# Patient Record
Sex: FEMALE | Employment: FULL TIME | URBAN - METROPOLITAN AREA
[De-identification: names, ages, dates, MRNs, and addresses within clinical notes are randomized per-mention and may not be internally consistent; named-entity substitution may affect disease eponyms.]

---

## 2020-03-19 ENCOUNTER — OFFICE VISIT (OUTPATIENT)
Dept: FAMILY MEDICINE CLINIC | Age: 30
End: 2020-03-19

## 2020-03-19 VITALS
BODY MASS INDEX: 50.02 KG/M2 | HEART RATE: 81 BPM | TEMPERATURE: 99.1 F | WEIGHT: 293 LBS | HEIGHT: 64 IN | DIASTOLIC BLOOD PRESSURE: 67 MMHG | RESPIRATION RATE: 16 BRPM | OXYGEN SATURATION: 97 % | SYSTOLIC BLOOD PRESSURE: 104 MMHG

## 2020-03-19 DIAGNOSIS — E55.9 VITAMIN D DEFICIENCY: Primary | ICD-10-CM

## 2020-03-19 DIAGNOSIS — E66.01 OBESITY, MORBID (HCC): ICD-10-CM

## 2020-03-19 DIAGNOSIS — Z76.89 ENCOUNTER FOR WEIGHT MANAGEMENT: ICD-10-CM

## 2020-03-19 DIAGNOSIS — Z13.1 SCREENING FOR DIABETES MELLITUS: ICD-10-CM

## 2020-03-19 DIAGNOSIS — D35.2 PITUITARY MICROADENOMA (HCC): ICD-10-CM

## 2020-03-19 DIAGNOSIS — Z13.220 SCREENING FOR HYPERLIPIDEMIA: ICD-10-CM

## 2020-03-19 RX ORDER — ERGOCALCIFEROL 1.25 MG/1
CAPSULE ORAL
COMMUNITY
Start: 2020-02-26

## 2020-03-19 RX ORDER — CABERGOLINE 0.5 MG/1
TABLET ORAL
COMMUNITY
Start: 2020-01-05

## 2020-03-19 NOTE — PROGRESS NOTES
1. Have you been to the ER, urgent care clinic since your last visit? Hospitalized since your last visit? No    2. Have you seen or consulted any other health care providers outside of the [address] [place] [address] since your last visit? Include any pap smears or colon screening.  No      Chief Complaint   Patient presents with    Weight Management     Initial     Body Weight: 350.6 LB  Body Fat%: 57.4 %  Muscle Mass Weight: 60.2  Body Water Weight: 26.5 %  Basal Metabolic Rate: 6808      Visit Vitals  /67 (BP 1 Location: Left arm, BP Patient Position: Sitting)   Pulse 81   Temp 99.1 °F (37.3 °C) (Oral)   Resp 16   Ht 5' 4\" (1.626 m)   Wt (!) 350 lb 9.6 oz (159 kg)   SpO2 97%   BMI 60.18 kg/m²

## 2020-03-19 NOTE — PROGRESS NOTES
[facility] Progress Note: Initial Physician Visit     Shivam Fuentes is a 26 y.o. female who is here for medical screening for entering the [facility].     CC:  Obesity  Has tried a program lifestyle wellness  She took phentermine  Regained it after stress increased the last 3-4 months  Se has mild cecilia and just got prescribed cpap  Weight History  I personally reviewed the [place] completed by patient and scanned into media section of chart. It includes duration of their obesity, maximum weight, goal weight and weight gain time line (timing), all of which give the context of their obesity AND a Family History of their obesity. Is their Weight Loss Goal entered in to Comments? Weight loss History  I personally reviewed the Medical Screening Newt Hylan completed by the patient and scanned into media section of chart. It includes number of weight loss attempts, the weight loss program that patients was most successful using, and if they have any hx of anorectic medication use, including OTC supplements. This captures modifying factors. Significant Medical History  History reviewed. No pertinent past medical history. I personally reviewed the Medical Screening Newt Hylan completed by the patient and scanned into media section of chart. This allows me to assess associated symptoms that are significant in the assessment of the patient's obesity and the patient's Past Medical History.     Outpatient Medications Marked as Taking for the 3/19/20 encounter (Office Visit) with Elma Whiting MD   Medication Sig Dispense Refill    cabergoline (DOSTINEX) 0.5 mg tablet TAKE 1 TABLET BY MOUTH TWICE A WEEK ON MONDAY AND THURSDAY      ergocalciferol (ERGOCALCIFEROL) 1,250 mcg (50,000 unit) capsule TAKE 1 CAPSULE BY MOUTH ONCE WEEKLY         SLEEP:      Significant Psychosocial History   Has a doctor every diagnosed with Binge Eating Disorder, Bulemia or Anorexia? : no     Compliance  Upcoming Travel? no    Social History  Social History     Tobacco Use    Smoking status: Never Smoker    Smokeless tobacco: Never Used   Substance Use Topics    Alcohol use: Yes     Comment: social       Exercise  I personally reviewed the Medical Screening Lucy Donovanist completed by the patient and scanned into media section of chart. Review of Systems  See HPI        Objective  Visit Vitals  /67 (BP 1 Location: Left arm, BP Patient Position: Sitting)   Pulse 81   Temp 99.1 °F (37.3 °C) (Oral)   Resp 16   Ht 5' 4\" (1.626 m)   Wt (!) 350 lb 9.6 oz (159 kg)   SpO2 97%   BMI 60.18 kg/m²         Weight Metrics 3/19/2020 3/19/2020 6/20/2016   Weight - 350 lb 9.6 oz 313 lb   Neck Circ (inches) 15.5 - -   Waist Measure Inches 61 - -   BMI - 60.18 kg/m2 -       Labs: See  labs scanned into Media section or in lab section of record      Physical Exam    Vital Signs Reviewed  Weight Management Metrics Reviewed    Appearance: well  HEENT:  Scleral icterus?  no  Neck:  Thyromegaly or nodules? no  Mouth: Large tongue no  Heart:  rrr  Lungs:  clear  Abdomen:     Hepatomegaly? no   Striae present? no  Skin:    Acne?  no   Hirsutism? no   Skin tags? no   Acanthosis Nigricans?  no  Ext:  Edema? no      Assessment & Plan  Encounter Diagnoses   Name Primary?  Vitamin D deficiency Yes    Obesity, [facility]bid [facility] [address]     Pituitary microadenoma ([facility] [address]     Encounter for weight management     Screening for diabetes mellitus     Screening for hyperlipidemia        1. labs reviewed w/ patient  2. EKG reviewed w/ patient:   Personally reviewed by me, NSR, No abnormalities,    QTc = 414 sec (Upper limit is 440 for males & 460 for females)      3.  Medication changes include:     Based on his history, labs and EKG, Kat Hall is  a good candidate for the [facility]     25 min of the 45 minutes face to face time with Belia consisted of counseling & coordinating and/or discussing treatment plans in reference to her obesity The primary encounter diagnosis was Vitamin D deficiency. Diagnoses of Obesity, morbid ([address] Pituitary microadenoma ([facility] [address] Encounter for weight management, Screening for diabetes mellitus, and Screening for hyperlipidemia were also pertinent to this visit.

## 2020-04-20 ENCOUNTER — VIRTUAL VISIT (OUTPATIENT)
Dept: FAMILY MEDICINE CLINIC | Age: 30
End: 2020-04-20

## 2020-04-20 VITALS — BODY MASS INDEX: 58.88 KG/M2 | WEIGHT: 293 LBS

## 2020-04-20 DIAGNOSIS — D35.2 PITUITARY MICROADENOMA (HCC): ICD-10-CM

## 2020-04-20 DIAGNOSIS — E66.01 OBESITY, MORBID (HCC): ICD-10-CM

## 2020-04-20 DIAGNOSIS — E55.9 VITAMIN D DEFICIENCY: ICD-10-CM

## 2020-04-20 DIAGNOSIS — G47.33 OSA (OBSTRUCTIVE SLEEP APNEA): Primary | ICD-10-CM

## 2020-04-20 NOTE — PROGRESS NOTES
[facility] Progress Note:   F/up Physician Visit    Bin Restrepo is a 26 y.o. female who was seen by synchronous (real-time) audio-video technology on 4/20/2020. Consent:  She and/or her healthcare decision maker is aware that this patient-initiated Telehealth encounter is a billable service, with coverage as determined by her insurance carrier. She is aware that she may receive a bill and has provided verbal consent to proceed: Yes    I was at home while conducting this encounter. 712  Subjective:   Bin Restrepo was seen for No chief complaint on file. f/up physician visit for the [facility] / LCD Program.  Prior to Admission medications    Medication Sig Start Date End Date Taking?  Authorizing Provider   cabergoline (DOSTINEX) 0.5 mg tablet TAKE 1 TABLET BY MOUTH TWICE A WEEK ON MONDAY AND THURSDAY 1/5/20   Provider, Historical   ergocalciferol (ERGOCALCIFEROL) 1,250 mcg (50,000 unit) capsule TAKE 1 CAPSULE BY MOUTH ONCE WEEKLY 2/26/20   Provider, Historical     Not on File    Patient Active Problem List    Diagnosis Date Noted    Obesity, morbid ([facility] [address] 03/19/2020     Current Outpatient Medications   Medication Sig Dispense Refill    cabergoline (DOSTINEX) 0.5 mg tablet TAKE 1 TABLET BY MOUTH TWICE A WEEK ON MONDAY AND THURSDAY      ergocalciferol (ERGOCALCIFEROL) 1,250 mcg (50,000 unit) capsule TAKE 1 CAPSULE BY MOUTH ONCE WEEKLY         ROS      CC: Weight Management      Bin Restrepo is a 26 y.o. female who is here for her      Weight Metrics 4/20/2020 3/19/2020 3/19/2020 6/20/2016   Weight 343 lb - 350 lb 9.6 oz 313 lb   Neck Circ (inches) - 15.5 - -   Waist Measure Inches - 61 - -   BMI 58.88 kg/m2 - 60.18 kg/m2 -               Participation   Did you attend clinic and class last week? no    Review of Systems  Since your last visit, have you experienced any complications? no  If yes, please list:     Are you taking an appetite suppressant? no  If so, is there any Chest Pain, Palpitations or Dizziness? BP Readings from Last 3 Encounters:   03/19/20 104/67       SLEEP: 6-7, has cpap but not sleeping the whole night with    Have you received any other medical care this week? no  If yes, where and for what? Have you discontinued or changed any medicine or dose of your medicine since your last visit with Dr Mick Etienne? no  If yes, where and for what? Diet  How many ounces of calorie-free liquids did you consume each day? 64* oz    How many meal replacements did you take each day? 2 and 1 meal    Did you have any problems adhering to the program?  no If yes, please explain:      Exercise  Aerobic exercise: 45 min  Resistance exercise: *5 workouts / week  Any discomfort? If yes, where? Objective  Visit Vitals  Wt 343 lb (155.6 kg)   BMI 58.88 kg/m²     No LMP recorded.                     PHYSICAL EXAMINATION:  [ INSTRUCTIONS:  \"[x]\" Indicates a positive item  \"[]\" Indicates a negative item  -- DELETE ALL ITEMS NOT EXAMINED]  Vital Signs: (As obtained by patient/caregiver at home)  Visit Vitals  Wt 343 lb (155.6 kg)   BMI 58.88 kg/m²        Constitutional: [x] Appears well-developed and well-nourished [x] No apparent distress      [] Abnormal -     Mental status: [x] Alert and awake  [x] Oriented to person/place/time [x] Able to follow commands    [] Abnormal -     Eyes:   EOM    [x]  Normal    [] Abnormal -   Sclera  [x]  Normal    [] Abnormal -          Discharge [x]  None visible   [] Abnormal -     HENT: [x] Normocephalic, atraumatic  [] Abnormal -   [x] Mouth/Throat: Mucous membranes are moist    External Ears [x] Normal  [] Abnormal -    Neck: [x] No visualized mass [] Abnormal -     Pulmonary/Chest: [x] Respiratory effort normal   [x] No visualized signs of difficulty breathing or respiratory distress        [] Abnormal -      Musculoskeletal:   [x] Normal gait with no signs of ataxia         [x] Normal range of motion of neck        [] Abnormal - Neurological:        [x] No Facial Asymmetry (Cranial nerve 7 motor function) (limited exam due to video visit)          [x] No gaze palsy        [] Abnormal -          Skin:        [x] No significant exanthematous lesions or discoloration noted on facial skin         [] Abnormal -            Psychiatric:       [x] Normal Affect [] Abnormal -        [x] No Hallucinations    Other pertinent observable physical exam findings:-      Assessment / Plan    Encounter Diagnoses   Name Primary?  ZAHRAA (obstructive sleep apnea) Yes    Obesity, morbid (HCC)     Pituitary microadenoma (HCC)     Vitamin D deficiency      Diagnoses and all orders for this visit:    1. ZAHRAA (obstructive sleep apnea)  Struggling getting comfortable using the cpap  I encouraged her to keep trying to use it. Wear it as long as she can tolerate and each night it will get easier  2. Obesity, morbid ([address]  Has lost 7 lbs in 1 month  I will ask our dietitian to call her to give more support  She just started as we shut down for covid 19  3. Pituitary microadenoma ([address]  Managed by endocrine  4. Vitamin D deficiency      1. Weight management improved   Progress was reviewed with patient    2. Labs    Latest results reviewed with patient   Lab slip given to pt for f/up  labs     The primary encounter diagnosis was ZAHRAA (obstructive sleep apnea). Diagnoses of Obesity, morbid ([address] Pituitary microadenoma ([address] and Vitamin D deficiency were also pertinent to this visit. Coding Help - Use CPT Codes 03690-24695, 75354-53622 for Established and New Patients respectively, either employing EM elements or Time rules. Other codes (example consult codes) may also apply. We discussed the expected course, resolution and complications of the diagnosis(es) in detail. Medication risks, benefits, costs, interactions, and alternatives were discussed as indicated.   I advised her to contact the office if her condition worsens, changes or fails to improve as anticipated. She expressed understanding with the diagnosis(es) and plan. Pursuant to the emergency declaration under the [facility] waiver authority and the [employer] Act, this Virtual  Visit was conducted, with patient's consent, to reduce the patient's risk of exposure to COVID-19 and provide continuity of care for an established patient. Services were provided through a video synchronous discussion virtually to substitute for in-person clinic visit.     Joi Fairchild MD

## 2020-05-12 ENCOUNTER — TELEPHONE (OUTPATIENT)
Dept: FAMILY MEDICINE CLINIC | Age: 30
End: 2020-05-12

## 2020-05-12 NOTE — TELEPHONE ENCOUNTER
The patient is calling to check on her most recent lab work; the patient had an appointment with Dr. Don John on 3-; she states she came back to our office the next day to have the blood work done; the lab results do not show up in the computer; can you please look into this to see what happened to her blood work; thank you    Best contact number for the patient is [phone]

## 2020-05-12 NOTE — TELEPHONE ENCOUNTER
Two patient Identification confirmed. [phone] Unsure of patient lab work. Patient notified. Patient states will call office back with location to send labs to get them drawn again.

## 2020-05-22 LAB
ALBUMIN SERPL-MCNC: 4.3 G/DL (ref 3.9–5)
ALBUMIN/GLOB SERPL: 1.4 {RATIO} (ref 1.2–2.2)
ALP SERPL-CCNC: 76 IU/L (ref 39–117)
ALT SERPL-CCNC: 19 IU/L (ref 0–32)
AST SERPL-CCNC: 16 IU/L (ref 0–40)
BILIRUB SERPL-MCNC: 0.5 MG/DL (ref 0–1.2)
BUN SERPL-MCNC: 9 MG/DL (ref 6–20)
BUN/CREAT SERPL: 13 (ref 9–23)
CALCIUM SERPL-MCNC: 9.9 MG/DL (ref 8.7–10.2)
CHLORIDE SERPL-SCNC: 102 MMOL/L (ref 96–106)
CHOLEST SERPL-MCNC: 159 MG/DL (ref 100–199)
CO2 SERPL-SCNC: 22 MMOL/L (ref 20–29)
CREAT SERPL-MCNC: 0.71 MG/DL (ref 0.57–1)
EST. AVERAGE GLUCOSE BLD GHB EST-MCNC: 97 MG/DL
GLOBULIN SER CALC-MCNC: 3 G/DL (ref 1.5–4.5)
GLUCOSE SERPL-MCNC: 84 MG/DL (ref 65–99)
HBA1C MFR BLD: 5 % (ref 4.8–5.6)
HDLC SERPL-MCNC: 44 MG/DL
INTERPRETATION, 910389: NORMAL
LDLC SERPL CALC-MCNC: 93 MG/DL (ref 0–99)
MAGNESIUM SERPL-MCNC: 1.8 MG/DL (ref 1.6–2.3)
POTASSIUM SERPL-SCNC: 4.3 MMOL/L (ref 3.5–5.2)
PROT SERPL-MCNC: 7.3 G/DL (ref 6–8.5)
SODIUM SERPL-SCNC: 137 MMOL/L (ref 134–144)
TRIGL SERPL-MCNC: 108 MG/DL (ref 0–149)
TSH SERPL DL<=0.005 MIU/L-ACNC: 3.69 UIU/ML (ref 0.45–4.5)
URATE SERPL-MCNC: 6.2 MG/DL (ref 2.5–7.1)
VLDLC SERPL CALC-MCNC: 22 MG/DL (ref 5–40)

## 2020-05-26 ENCOUNTER — TELEPHONE (OUTPATIENT)
Dept: FAMILY MEDICINE CLINIC | Age: 30
End: 2020-05-26

## 2020-05-26 NOTE — TELEPHONE ENCOUNTER
Two patient Identification confirmed. The liver, kidney, cholesterol , thyroid hormone and blood sugar are normal  Patient verbalized understanding.

## 2021-08-18 ENCOUNTER — HOSPITAL ENCOUNTER (EMERGENCY)
Age: 31
Discharge: HOME | End: 2021-08-18
Attending: EMERGENCY MEDICINE
Payer: COMMERCIAL

## 2021-08-18 VITALS
HEART RATE: 90 BPM | HEIGHT: 64 IN | WEIGHT: 293 LBS | OXYGEN SATURATION: 98 % | BODY MASS INDEX: 50.02 KG/M2 | RESPIRATION RATE: 22 BRPM | TEMPERATURE: 97.8 F | DIASTOLIC BLOOD PRESSURE: 68 MMHG | SYSTOLIC BLOOD PRESSURE: 107 MMHG

## 2021-08-18 DIAGNOSIS — J01.10 ACUTE FRONTAL SINUSITIS, RECURRENCE NOT SPECIFIED: Primary | ICD-10-CM

## 2021-08-18 PROCEDURE — 99283 EMERGENCY DEPT VISIT LOW MDM: CPT

## 2021-08-18 PROCEDURE — 74011636637 HC RX REV CODE- 636/637: Performed by: EMERGENCY MEDICINE

## 2021-08-18 PROCEDURE — 74011250637 HC RX REV CODE- 250/637: Performed by: EMERGENCY MEDICINE

## 2021-08-18 RX ORDER — AMOXICILLIN 500 MG/1
500 CAPSULE ORAL ONCE
Status: COMPLETED | OUTPATIENT
Start: 2021-08-18 | End: 2021-08-18

## 2021-08-18 RX ORDER — AMOXICILLIN 500 MG/1
500 TABLET, FILM COATED ORAL 3 TIMES DAILY
Qty: 21 TABLET | Refills: 0 | Status: SHIPPED | OUTPATIENT
Start: 2021-08-18 | End: 2021-08-25

## 2021-08-18 RX ORDER — PREDNISONE 20 MG/1
TABLET ORAL
Qty: 12 TABLET | Refills: 0 | Status: SHIPPED | OUTPATIENT
Start: 2021-08-18

## 2021-08-18 RX ORDER — PREDNISONE 20 MG/1
60 TABLET ORAL ONCE
Status: COMPLETED | OUTPATIENT
Start: 2021-08-18 | End: 2021-08-18

## 2021-08-18 RX ADMIN — PREDNISONE 60 MG: 20 TABLET ORAL at 22:45

## 2021-08-18 RX ADMIN — AMOXICILLIN 500 MG: 500 CAPSULE ORAL at 22:45

## 2021-08-19 NOTE — ED PROVIDER NOTES
EMERGENCY DEPARTMENT HISTORY AND PHYSICAL EXAM      Date: 8/18/2021  Patient Name: Leonette Goldmann    History of Presenting Illness     Chief Complaint   Patient presents with    Sore Throat    Nasal Congestion    Cough    Headache       History Provided By: Patient    HPI: Leonette Goldmann, 27 y.o. female   presents to the ED with cc of frontal headache and sinus congestion. Patient complains of frontal headache with nasal congestion. Patient also complains of postnasal drip that induces nonproductive cough for last 3 days. Headache is constant with fluctuating intensity of mild to moderate degree. Patient is taking Tylenol and Advil with some relief. No fever chills. No visual changes. No nausea vomiting. No paresthesia or motor dysfunction. No history of head injury. PCP: Unknown, Provider, MD    No current facility-administered medications on file prior to encounter. Current Outpatient Medications on File Prior to Encounter   Medication Sig Dispense Refill    cabergoline (DOSTINEX) 0.5 mg tablet TAKE 1 TABLET BY MOUTH TWICE A WEEK ON MONDAY AND THURSDAY      ergocalciferol (ERGOCALCIFEROL) 1,250 mcg (50,000 unit) capsule TAKE 1 CAPSULE BY MOUTH ONCE WEEKLY         Past History     Past Medical History:  Past Medical History:   Diagnosis Date    Pituitary adenoma ([facility] [address]     Vitamin D deficiency        Past Surgical History:  History reviewed. No pertinent surgical history. Family History:  History reviewed. No pertinent family history. Social History:  Social History     Tobacco Use    Smoking status: Never Smoker    Smokeless tobacco: Never Used   Substance Use Topics    Alcohol use: Yes     Comment: social    Drug use: Not Currently       Allergies: Allergies   Allergen Reactions    Spinach Hives         Review of Systems   Review of Systems   Constitutional: Negative for activity change, appetite change, chills and fever.    HENT: Positive for rhinorrhea, sinus pressure and sinus pain. Negative for sore throat. Eyes: Negative for discharge. Respiratory: Negative for shortness of breath. Cardiovascular: Negative for chest pain. Gastrointestinal: Negative for nausea. Endocrine: Negative for polyuria. Genitourinary: Negative for difficulty urinating. Musculoskeletal: Negative for arthralgias. Skin: Negative for rash. Neurological: Positive for headaches. Hematological: Negative for adenopathy. Psychiatric/Behavioral: Negative for suicidal ideas. All other systems reviewed and are negative. Physical Exam   Physical Exam  Vitals and nursing note reviewed. Constitutional:       Appearance: Normal appearance. HENT:      Head: Normocephalic and atraumatic. Right Ear: Tympanic membrane normal.      Left Ear: Tympanic membrane normal.      Nose: Congestion present. Mouth/Throat:      Mouth: Mucous membranes are moist.      Pharynx: Oropharynx is clear. Eyes:      Conjunctiva/sclera: Conjunctivae normal.      Pupils: Pupils are equal, round, and reactive to light. Cardiovascular:      Rate and Rhythm: Normal rate and regular rhythm. Heart sounds: Normal heart sounds. Pulmonary:      Effort: Pulmonary effort is normal.      Breath sounds: Normal breath sounds. Abdominal:      General: Abdomen is flat. Bowel sounds are normal.      Palpations: Abdomen is soft. Musculoskeletal:      Cervical back: Neck supple. Right lower leg: No edema. Left lower leg: No edema. Skin:     General: Skin is warm and dry. Neurological:      General: No focal deficit present. Mental Status: She is alert and oriented to person, place, and time. Cranial Nerves: No cranial nerve deficit. Motor: No weakness. Psychiatric:         Mood and Affect: Mood normal.         Diagnostic Study Results     Labs -   No results found for this or any previous visit (from the past 12 hour(s)).     Radiologic Studies -   No orders to display     CT Results  (Last 48 hours)    None        CXR Results  (Last 48 hours)    None            Medical Decision Making   I am the first provider for this patient. I reviewed the vital signs, available nursing notes, past medical history, past surgical history, family history and social history. Vital Signs-Reviewed the patient's vital signs. Patient Vitals for the past 12 hrs:   Temp Pulse Resp BP SpO2   08/18/21 2209 97.8 °F (36.6 °C) 90 22 107/68 98 %       Records Reviewed:     Provider Notes (Medical Decision Making):       ED Course:   Initial assessment performed. The patients presenting problems have been discussed, and they are in agreement with the care plan formulated and outlined with them. I have encouraged them to ask questions as they arise throughout their visit. PROCEDURES      Disposition: Condition stable   DC- Adult Discharges: All of the diagnostic tests were reviewed and questions answered. Diagnosis, care plan and treatment options were discussed. understand instructions and will follow up as directed. The patients results have been reviewed with them. They have been counseled regarding their diagnosis. The patient verbally convey understanding and agreement of the signs, symptoms, diagnosis, treatment and prognosis and additionally agrees to follow up as recommended. They also agree with the care-plan and convey that all of their questions have been answered. I have also put together some discharge instructions for them that include: 1) educational information regarding their diagnosis, 2) how to care for their diagnosis at home, as well a 3) list of reasons why they would want to return to the ED prior to their follow-up appointment, should their condition change. PLAN:  1. Current Discharge Medication List      START taking these medications    Details   amoxicillin 500 mg tab Take 500 mg by mouth three (3) times daily for 7 days.   Qty: 21 Tablet, Refills: 0  Start date: 8/18/2021, End date: 8/25/2021      predniSONE (DELTASONE) 20 mg tablet 3 tablets for 2 days then 2 tablets for 2 days then 1 tablet for 2 day  Qty: 12 Tablet, Refills: 0  Start date: 8/18/2021      sodium chloride (AYR SALINE) 0.65 % drop 2 Drops by Both Nostrils route every two (2) hours as needed for Congestion for up to 42 days. Qty: 50 mL, Refills: 0  Start date: 8/18/2021, End date: 9/29/2021           2. Follow-up Information     Follow up With Specialties Details Why Contact Info    Follow up with your primary care physician  Schedule an appointment as soon as possible for a visit in 3 days As needed         Return to ED if worse     Diagnosis     Clinical Impression:   1. Acute frontal sinusitis, recurrence not specified        Please note that this dictation was completed with [employer], the computer voice recognition software. Quite often unanticipated grammatical, syntax, homophones, and other interpretive errors are inadvertently transcribed by the computer software. Please disregard these errors. Please excuse any errors that have escaped final proofreading. Thank you.

## 2021-09-10 ENCOUNTER — HOSPITAL ENCOUNTER (EMERGENCY)
Age: 31
Discharge: HOME | End: 2021-09-10
Attending: EMERGENCY MEDICINE
Payer: COMMERCIAL

## 2021-09-10 ENCOUNTER — APPOINTMENT (OUTPATIENT)
Dept: GENERAL RADIOLOGY | Age: 31
End: 2021-09-10
Attending: EMERGENCY MEDICINE
Payer: COMMERCIAL

## 2021-09-10 VITALS
WEIGHT: 293 LBS | DIASTOLIC BLOOD PRESSURE: 91 MMHG | SYSTOLIC BLOOD PRESSURE: 133 MMHG | BODY MASS INDEX: 50.02 KG/M2 | HEIGHT: 64 IN | TEMPERATURE: 98.4 F | OXYGEN SATURATION: 100 % | RESPIRATION RATE: 24 BRPM

## 2021-09-10 DIAGNOSIS — M25.561 ACUTE PAIN OF RIGHT KNEE: Primary | ICD-10-CM

## 2021-09-10 PROCEDURE — 73562 X-RAY EXAM OF KNEE 3: CPT

## 2021-09-10 PROCEDURE — 99283 EMERGENCY DEPT VISIT LOW MDM: CPT

## 2021-09-10 RX ORDER — TRAMADOL HYDROCHLORIDE 50 MG/1
50 TABLET ORAL
Qty: 8 TABLET | Refills: 0 | Status: SHIPPED | OUTPATIENT
Start: 2021-09-10 | End: 2021-09-12

## 2021-09-10 RX ORDER — IBUPROFEN 800 MG/1
800 TABLET ORAL
Qty: 20 TABLET | Refills: 0 | Status: SHIPPED | OUTPATIENT
Start: 2021-09-10

## 2021-09-10 NOTE — ED PROVIDER NOTES
EMERGENCY DEPARTMENT HISTORY AND PHYSICAL EXAM      Date: 9/10/2021  Patient Name: Naomi Chan    History of Presenting Illness     Chief Complaint   Patient presents with    Knee Pain       History Provided By: Patient    HPI: Naomi Chan, 27 y.o. female with a past medical history significant obesity presents to the ED with cc of right knee pain. Patient reports onset of right knee pain approximately 3 weeks ago. [facility] stated [facility] and [facility] given [facility] for prednisone. She stated that she had an x-ray at that time as well. The patient was told that if her symptoms did not improve in 4 weeks that she should follow-up with orthopedics. The patient stated that she was feeling better until yesterday when her knee gave out while walking. There are no other complaints, changes, or physical findings at this time. PCP: Unknown, Provider, MD    No current facility-administered medications on file prior to encounter. Current Outpatient Medications on File Prior to Encounter   Medication Sig Dispense Refill    predniSONE (DELTASONE) 20 mg tablet 3 tablets for 2 days then 2 tablets for 2 days then 1 tablet for 2 day 12 Tablet 0    sodium chloride (AYR SALINE) 0.65 % drop 2 Drops by Both Nostrils route every two (2) hours as needed for Congestion for up to 42 days. 50 mL 0    cabergoline (DOSTINEX) 0.5 mg tablet TAKE 1 TABLET BY MOUTH TWICE A WEEK ON MONDAY AND THURSDAY      ergocalciferol (ERGOCALCIFEROL) 1,250 mcg (50,000 unit) capsule TAKE 1 CAPSULE BY MOUTH ONCE WEEKLY         Past History     Past Medical History:  Past Medical History:   Diagnosis Date    Pituitary adenoma ([facility] [address]     Vitamin D deficiency        Past Surgical History:  No past surgical history on file. Family History:  No family history on file.     Social History:  Social History     Tobacco Use    Smoking status: Never Smoker    Smokeless tobacco: Never Used   Substance Use Topics    Alcohol use: Yes     Comment: social    Drug use: Not Currently       Allergies: Allergies   Allergen Reactions    Spinach Hives         Review of Systems   Gen: no fevers, no chills  Musc: right knee pain  Neuro: no weakness, no numbness, no tingling  Skin: No rash, no abrasion  ROS otherwise normal  Review of Systems    Physical Exam   Gen: WD WN female in NAD, Nontoxic appearing  Musc: no deformity; + tenderness anterior knee with deep palpation. Difficult exam due to morbid obesity; o/w DNVI  Skin: Warm, dry, intact, no rash, no abrasion  Neuro: A&O x 4, normal speech  Psych: normal mood, normal affect  Physical Exam    Diagnostic Study Results     Labs -   No results found for this or any previous visit (from the past 12 hour(s)). Radiologic Studies -   @lastxrresult@  CT Results  (Last 48 hours)    None        CXR Results  (Last 48 hours)    None        XR KNEE RT 3 V   Final Result        Moderate DJD. No fracture or effusion. Possible loose bodies    Medical Decision Making   I am the first provider for this patient. I reviewed the vital signs, available nursing notes, past medical history, past surgical history, family history and social history. Vital Signs-Reviewed the patient's vital signs. Patient Vitals for the past 12 hrs:   Temp Resp BP SpO2   09/10/21 1029    100 %   09/10/21 1021 98.4 °F (36.9 °C) 24 (!) 133/91 100 %       Records Reviewed: Nursing Notes        Provider Notes (Medical Decision Making):   Right knee x-ray revealed moderate DJD, no fracture or effusion, possible loose bodies. Discussed ice and elevation, rx ibuprofen and tramadol. Given sedation precautions. Unable to provide knee immobilizer due to girth of extremity but ace wrapped knee. Crutches, cane, or walker as needed. Given Ortho follow-up instructions, voices understanding, and is agreeable to plan. [facility]         ED Course:   Initial assessment performed.  The patients presenting problems have been discussed, and they are in agreement with the care plan formulated and outlined with them. I have encouraged them to ask questions as they arise throughout their visit. PROCEDURES  Procedures       PLAN:  1. Discharge Medication List as of 9/10/2021  1:55 PM      START taking these medications    Details   ibuprofen (MOTRIN) 800 mg tablet Take 1 Tablet by mouth every eight (8) hours as needed for Pain., Normal, Disp-20 Tablet, R-0      traMADoL (Ultram) 50 mg tablet Take 1 Tablet by mouth every six (6) hours as needed for Pain for up to 2 days. Max Daily Amount: 200 mg., Normal, Disp-8 Tablet, R-0         CONTINUE these medications which have NOT CHANGED    Details   predniSONE (DELTASONE) 20 mg tablet 3 tablets for 2 days then 2 tablets for 2 days then 1 tablet for 2 day, Normal, Disp-12 Tablet, R-0      sodium chloride (AYR SALINE) 0.65 % drop 2 Drops by Both Nostrils route every two (2) hours as needed for Congestion for up to 42 days. , Normal, Disp-50 mL, R-0      cabergoline (DOSTINEX) 0.5 mg tablet TAKE 1 TABLET BY MOUTH TWICE A WEEK ON MONDAY AND THURSDAY, Historical Med      ergocalciferol (ERGOCALCIFEROL) 1,250 mcg (50,000 unit) capsule TAKE 1 CAPSULE BY MOUTH ONCE WEEKLY, Historical Med           2. Follow-up Information     Follow up With Specialties Details Why Contact Info    [facility]  Schedule an appointment as soon as possible for a visit in 3 days  [address] [facility]  [address] [facility] [address] [facility]  [phone]        Return to ED if worse     Diagnosis     Clinical Impression:   1.  Acute pain of right knee

## 2021-09-10 NOTE — ED TRIAGE NOTES
States that pain started yesterday. Unable to put pressure on the knee. No injfury noted. States a week ago [facility] because right knee was really stiff and could not bend it. Was put on prednisone for a week. It helped.

## 2021-09-10 NOTE — DISCHARGE INSTRUCTIONS
Take the ibuprofen as prescribed as needed for pain. Take the tramadol as prescribed as needed for severe pain. Do not drive, work, operate machinery, or drink alcohol with taking this medication. Ice and elevate the right knee as needed. Use the Ace wrap as needed. Use crutches, cane, or walker as needed to help you with walking. Follow-up with [facility] orthopedics.

## 2021-09-10 NOTE — LETTER
[address] [facility][note 3] EMERGENCY DEPARTMENT  [address] [facility] [address]  [phone]    Work/School Note    Date: 9/10/2021    To Whom It May concern:    José Alfonso was seen and treated today in the emergency room by the following provider(s):  Attending Provider: Wilder Azar MD.      José Alfonso may return to work Monday 09/13/2021  Sincerely,      Dr. Valentino Santana

## 2022-03-19 PROBLEM — E66.01 OBESITY, MORBID (HCC): Status: ACTIVE | Noted: 2020-03-19

## 2023-05-11 RX ORDER — PREDNISONE 20 MG/1
TABLET ORAL
COMMUNITY
Start: 2021-08-18

## 2023-05-11 RX ORDER — CABERGOLINE 0.5 MG/1
TABLET ORAL
COMMUNITY
Start: 2020-01-05

## 2023-05-11 RX ORDER — ERGOCALCIFEROL 1.25 MG/1
CAPSULE ORAL
COMMUNITY
Start: 2020-02-26

## 2023-05-11 RX ORDER — IBUPROFEN 800 MG/1
TABLET ORAL EVERY 8 HOURS PRN
COMMUNITY
Start: 2021-09-10